# Patient Record
Sex: FEMALE | Race: WHITE
[De-identification: names, ages, dates, MRNs, and addresses within clinical notes are randomized per-mention and may not be internally consistent; named-entity substitution may affect disease eponyms.]

---
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
## 2025-01-13 NOTE — HHI.HP
Diagnosis





(1) Right flank pain


(2) Ovarian cyst


(3) Kidney stone on left side


(4) Hematuria





History of Present Illness


2/16/18


Shelly Ortiz is a 17 year old female admitted due to severe right flank pain 

which started yesterday. She was seen in the ED by Dr. Slater and not felt to 

have an acute appendicitis nor surgical abdomen. Her CBC and CRP were benign, 

but on CT of her abdomen she had a left kidney stone and on pelvic ultrasound 

she had a small right ovarian cyst. She improved overnight, and now wishes to 

go home since the ain is down to 3/10 and she has tolerated a regular diet 

although with some nausea. She can jump up and down without worsening of the 

pain.





Allergies


Coded Allergies:  


     Sulfa (Sulfonamide Antibiotics) (Verified  Allergy, Intermediate, HIVES, 2/

15/18)


     azithromycin (Verified  Allergy, Intermediate, HIVES, 2/15/18)


     diazepam (Verified  Allergy, Intermediate, HIVES, 2/15/18)


     erythromycin base (Verified  Allergy, Intermediate, HIVES, 2/15/18)





Past Medical History


UTIs


Depression





Past Surgical History


Tonsillectomy





Family History


Notable for kidney stones





Social History


Lives with family





Review of Systems


Except as stated in HPI:  all other systems reviewed are Neg





Exam


Physical Exam


Constitutional:  Well Developed, Well Nourished


Neurology:  Alert, Interactive


[place] Coma Scale:  15


Pain Scale:  3


[place] Pain Scale:  3


Eyes:  PERRL, EOMI


Cranial Nerves:  Intact


Peripheral Nerves:  Intact


Endocrine:  Normal Growth, Normal Development


ENT:  Patent Airway, Swallows Easily


General:  No Apnea, No Cough, No Snoring, No Wheezing, No Respiratory distress


Lungs:  Clear, Breathing sounds equal, No distress


Cardiovascular:  Pulses: Full, Murmur: None, Perfusion:  Good, Rhythm:  NSR


Cardiovascular:  No Chest pain, No Exertional dyspnea, No Palpitations, No 

Syncope, No Other


Gastroenterology:  Abdominal pain


Gastro Remarks


3/10 right flank pain


Urine Output:  Good


Genitourinary:  Hematuria, 


   No Urine frequency, No Abnormal vaginal bleeding, No Dysmenorrhea, No Dysuria

, No Davies in place


Hematology:  No Bleeding, No Pallor, No Petechiae, No Bruising


Tubes & Lines:  Peripheral IV Line


Infectious Disease:  Afebrile


Infectious Disease:  Antibiotics, 


   No Cultures


ID Remarks


for otitis media


Skin:  Clear, Dry, Intact


Movement:  SMAE, No Deficits


Immunologic/Allergic:  No Eczema, No Urticaria, No Other


Psychiatric:  No Anxiety, No Confusion, No Abnormal Mood





Results


Vital Signs and I&O











  Date Time  Temp Pulse Resp B/P (MAP) Pulse Ox O2 Delivery O2 Flow Rate FiO2


 


2/16/18 08:25     99 Room Air  


 


2/16/18 08:25 97.8 62 16 119/52 (74) 99   


 


2/16/18 04:00      Room Air  


 


2/16/18 04:00 98.4 57 16 89/49 (62) 99   


 


2/16/18 00:00 98.8 57 16 91/46 (61) 97   


 


2/16/18 00:00      Room Air  


 


2/15/18 20:00      Room Air  


 


2/15/18 19:30 98.2 60 17 96/52 (67) 100   


 


2/15/18 19:20        


 


2/15/18 18:33        


 


2/15/18 17:16   18     


 


2/15/18 17:00  66 22 97/56 (70) 100 Room Air  














 2/17/18





 07:00


 


Intake Total 962 ml


 


Balance 962 ml











Laboratory/Microbiology











Test


  2/15/18


16:50 2/16/18


03:55


 


Clue Cells (Wet Prep) NONE SEEN  


 


Vaginal Trichomonas (Wet Prep) NONE SEEN  


 


Vaginal Yeast (Wet Prep) NONE SEEN  


 


Chlamydia trachomatis DNA


(PCR) NOT DETECTED 


  


 


 


Neisseria gonorrhoeae DNA


(PCR) NOT DETECTED 


  


 


 


White Blood Count  8.9 TH/MM3 


 


Red Blood Count  3.28 MIL/MM3 


 


Hemoglobin  10.4 GM/DL 


 


Hematocrit  29.5 % 


 


Mean Corpuscular Volume  89.9 FL 


 


Mean Corpuscular Hemoglobin  31.8 PG 


 


Mean Corpuscular Hemoglobin


Concent 


  35.3 % 


 


 


Red Cell Distribution Width  12.4 % 


 


Platelet Count  208 TH/MM3 


 


Mean Platelet Volume  7.5 FL 


 


Neutrophils (%) (Auto)  57.8 % 


 


Lymphocytes (%) (Auto)  30.6 % 


 


Monocytes (%) (Auto)  7.4 % 


 


Eosinophils (%) (Auto)  1.4 % 


 


Basophils (%) (Auto)  2.8 % 


 


Neutrophils # (Auto)  5.1 TH/MM3 


 


Lymphocytes # (Auto)  2.7 TH/MM3 


 


Monocytes # (Auto)  0.7 TH/MM3 


 


Eosinophils # (Auto)  0.1 TH/MM3 


 


Basophils # (Auto)  0.3 TH/MM3 


 


CBC Comment  DIFF FINAL 


 


Differential Comment   


 


Blood Urea Nitrogen  7 MG/DL 


 


Creatinine  0.53 MG/DL 


 


Random Glucose  82 MG/DL 


 


Total Protein  5.7 GM/DL 


 


Albumin  3.2 GM/DL 


 


Calcium Level  7.8 MG/DL 


 


Alkaline Phosphatase  63 U/L 


 


Aspartate Amino Transf


(AST/SGOT) 


  11 U/L 


 


 


Alanine Aminotransferase


(ALT/SGPT) 


  9 U/L 


 


 


Total Bilirubin  2.1 MG/DL 


 


Sodium Level  142 MEQ/L 


 


Potassium Level  3.5 MEQ/L 


 


Chloride Level  111 MEQ/L 


 


Carbon Dioxide Level  23.9 MEQ/L 


 


Anion Gap  7 MEQ/L 


 


C-Reactive Protein


  


  LESS THAN 0.29


MG/DL











Imaging





Last Impressions








Pelvis Ultrasound 2/15/18 0000 Signed





Impressions: 





 Service Date/Time:  Thursday, February 15, 2018 16:56 - CONCLUSION:  Tiny 





 complicated right ovarian cyst. Mild dependent debris within the urinary 

bladder 





     Isaiah Hugo MD 


 


Chest X-Ray 2/15/18 0000 Signed





Impressions: 





 Service Date/Time:  Thursday, February 15, 2018 17:34 - CONCLUSION: No acute 





 disease.       Martinez Dennis MD 


 


Abdomen/Pelvis CT 2/15/18 0000 Signed





Impressions: 





 Service Date/Time:  Thursday, February 15, 2018 14:47 - CONCLUSION:  No acute 

CT 





 findings in the abdomen or pelvis     Isaiah Hugo MD 











Medications


Reported Medications





Reported Meds & Active Scripts


Active


Reported


Amoxicillin 500 Mg Cap 500 Mg PO BID


Claritin (Loratadine) 5 Mg Chew 5 Mg CHEW DAILY





Assessment and Plan


Problem List:  


(1) Right flank pain


ICD Codes:  R10.9 - Unspecified abdominal pain


(2) Hematuria


ICD Codes:  R31.9 - Hematuria, unspecified


(3) Ovarian cyst


ICD Codes:  N83.209 - Unspecified ovarian cyst, unspecified side


(4) Kidney stone on left side


ICD Codes:  N20.0 - Calculus of kidney


Assessment and Plan


May discharge patient home today to parent(s).


Return to Emergency Department if condition worsens.  


Follow up with Primary Care Physician in 3-5 days


Copy of laboratory and X-ray reports to Primary Care Physician via parent or 

guardian.  


Diet and activity as tolerated.  


Medications per medication reconciliation sheet.


Minutes


Non-Critical care minutes:  35











Stephanie Drake MD Feb 16, 2018 16:47 Patient is overdue for yearly exam